# Patient Record
(demographics unavailable — no encounter records)

---

## 2024-12-13 NOTE — PROCEDURE
[FreeTextEntry6] :  Procedure: Flexible Nasal Endoscopy: Risks, benefits, and alternatives of flexible endoscopy were explained to the patient. The patient gave oral consent to proceed. The flexible scope was inserted into the right nasal cavity. Endoscopy of the inferior and middle meatus was performed. No polyp, mass, or lesion was appreciated. Olfactory cleft was clear. Spheno-ethmoid recess is clear. Nasopharynx was clear. Turbinates were without mass, but left middle turbinate displaced medially. The procedure was repeated on the contralateral side with similar findings. There was scarring between the right middle turbinate and the lateral wall. -ethmoid and maxillary sinuses clean and clear bilaterally -left middle turbinate displaced medially -scarring between the right middle turbinate and the lateral wall

## 2024-12-13 NOTE — ASSESSMENT
[FreeTextEntry1] :  Reviewed and reconciled medications, allergies, PMHx, PSHx, SocHx, FMHx.  Pt. with h/o chronic rhinitis, PND, and recurrent sinus infection s/p multiple sinus surgeries presents today stating that she has a lot of pain and pressure around her nose and eyes. She states that she frequently has a terrible taste in her mouth and a PND. She states that her nose gets very dry. She states that she had a COVID vaccine. She denies taking Aspirin or blood thinners.  CT Sinuses 11/29/24: -mild mucosal thickening in the right frontal sinus with obstructed drain pathway -mild mucosal thickening in the right ethmoid -sphenoid and maxillary sinuses clear -deviated septum posteriorly on the right and anteriorly on the left -no significant change from 8/2022  Physical exam: -NOSE score 45 -TNSS 5 -deviated septum right superiorly -normal palate and posterior pharyngeal wall -tonsils removed -tender over the frontal sinuses  ENT Procedure: Rigid nasal endoscopy -ethmoid and maxillary sinuses clean and clear bilaterally -left middle turbinate displaced medially -scarring between the right middle turbinate and the lateral wall  Plan: The in-office sinus surgery (right frontal) itself was discussed at length. Potential benefits of the procedure were discussed, though no guarantee was made nor implied. Alternatives of doing nothing versus ongoing medical therapy alone were also discussed. The risks of the procedure were individually discussed in detail as including, but not limited to bleeding, infection, loss of sense of smell, double vision, blindness, CSF leak requiring other procedures to repair, numbness of teeth/and or face, need for revision surgery, brain/skull base injury, anesthetic risks and unexpected risks.  All questions were answered. -FU after procedure

## 2024-12-13 NOTE — HISTORY OF PRESENT ILLNESS
[de-identified] : Pt. with h/o chronic rhinitis, PND, and recurrent sinus infection s/p multiple sinus surgeries presents today stating that she has a lot of pain and pressure around her nose and eyes. She states that she frequently has a terrible taste in her mouth and a PND. She states that her nose gets very dry. She states that she had a COVID vaccine. She denies taking Aspirin or blood thinners.

## 2024-12-13 NOTE — CONSULT LETTER
[Dear  ___] : Dear  [unfilled], [Courtesy Letter:] : I had the pleasure of seeing your patient, [unfilled], in my office today. [Please see my note below.] : Please see my note below. [Consult Closing:] : Thank you very much for allowing me to participate in the care of this patient.  If you have any questions, please do not hesitate to contact me. [Sincerely,] : Sincerely, [FreeTextEntry3] :  Simone Adrian MD FACS

## 2024-12-13 NOTE — PHYSICAL EXAM
[] : septum deviated to the right [Midline] : trachea located in midline position [Removed] : palatine tonsils previously removed [Normal] : no rashes [Hearing Loss Right Only] : normal [Hearing Loss Left Only] : normal [de-identified] : normal palate and posterior pharyngeal wall [FreeTextEntry2] : tender over the frontal sinuses

## 2024-12-13 NOTE — ADDENDUM
[FreeTextEntry1] :  Documented by Refugio Albarado acting as scribe for Dr. Adrian on 12/13/2024. All Medical record entries made by the Scribe were at my, Dr. Adrian, direction and personally dictated by me on 12/13/2024 . I have reviewed the chart and agree that the record accurately reflects my personal performance of the history, physical exam, assessment and plan. I have also personally directed, reviewed, and agreed with the discharge instructions.

## 2024-12-13 NOTE — DATA REVIEWED
[de-identified] : CT Sinuses 11/29/24: -mild mucosal thickening in the right frontal sinus with obstructed drain pathway -mild mucosal thickening in the right ethmoid -sphenoid and maxillary sinuses clear -deviated septum posteriorly on the right and anteriorly on the left -no significant change from 8/2022

## 2025-03-04 NOTE — HISTORY OF PRESENT ILLNESS
[de-identified] : Pt. with h/o multiple sinus surgeries presents today with facial pressure and swellling, nasal congestion with rhinorrhea for 1 month. Patient states she had covid last month and since then has felt her sinus symptoms getting worse. he has tried sinus rinse in the past but feels like fluid makes her symptoms worse. She also tried flonase without relief. She has been previously seen by Infectious disease for antibiotic guidance due to her multiple infections and antibiotics. At the time cephalexin was the treatment of choice. [FreeTextEntry1] : 3/4/25: Patient presents for possible sinus infection.  She has nasal congestion, runny nose worse on the left, and facial pressure worse on the left side. She notes she gets frequent sinus infections. She is on daily oral antihistamine and xhance. She gets over 6 sinus infections in a year.

## 2025-03-04 NOTE — PHYSICAL EXAM
[Hearing Loss Right Only] : normal [Hearing Loss Left Only] : normal [de-identified] : inflamaed turbinates [Midline] : trachea located in midline position [Normal] : no rashes [FreeTextEntry2] : maxillary and ethnoid tenderness left worse than right

## 2025-03-04 NOTE — ASSESSMENT
[FreeTextEntry1] : Reviewed and reconciled medications, allergies, PMHx, PSHx, SocHx, FMHx.   physical exam: right ear: normal left ear: normal Inflamed turbinates    Procedure: Flexible Nasal Endoscopy: Risks, benefits, and alternatives of flexible endoscopy were explained to the patient. The patient gave oral consent to proceed. The flexible scope was inserted into the right nasal cavity. Endoscopy of the inferior and middle meatus was performed. polypoid degeneration Sinuses look open and clear on both sides slightly mucoid discharge worse on the left side   Plan: Augmentin BID with food for 10 days Sent medrol dose pack   Case discussed with Dr. Adrian.

## 2025-03-04 NOTE — CONSULT LETTER
[Dear  ___] : Dear  [unfilled], [Courtesy Letter:] : I had the pleasure of seeing your patient, [unfilled], in my office today. [Please see my note below.] : Please see my note below. [Referral Closing:] : Thank you very much for seeing this patient.  If you have any questions, please do not hesitate to contact me. [Sincerely,] : Sincerely, [FreeTextEntry3] : Latrell Fragoso PA-C